# Patient Record
Sex: MALE | Race: WHITE | NOT HISPANIC OR LATINO | Employment: FULL TIME | URBAN - METROPOLITAN AREA
[De-identification: names, ages, dates, MRNs, and addresses within clinical notes are randomized per-mention and may not be internally consistent; named-entity substitution may affect disease eponyms.]

---

## 2017-10-13 ENCOUNTER — OFFICE VISIT (OUTPATIENT)
Dept: FAMILY MEDICINE CLINIC | Facility: CLINIC | Age: 27
End: 2017-10-13

## 2017-10-13 ENCOUNTER — HOSPITAL ENCOUNTER (OUTPATIENT)
Dept: GENERAL RADIOLOGY | Facility: HOSPITAL | Age: 27
Discharge: HOME | End: 2017-10-13
Admitting: NURSE PRACTITIONER

## 2017-10-13 VITALS
SYSTOLIC BLOOD PRESSURE: 116 MMHG | BODY MASS INDEX: 25.82 KG/M2 | HEART RATE: 60 BPM | WEIGHT: 201.2 LBS | DIASTOLIC BLOOD PRESSURE: 70 MMHG | HEIGHT: 74 IN | RESPIRATION RATE: 20 BRPM | TEMPERATURE: 97.5 F

## 2017-10-13 DIAGNOSIS — M25.541 PAIN INVOLVING JOINT OF FINGER OF RIGHT HAND: Primary | ICD-10-CM

## 2017-10-13 DIAGNOSIS — M25.541 PAIN INVOLVING JOINT OF FINGER OF RIGHT HAND: ICD-10-CM

## 2017-10-13 PROCEDURE — 99213 OFFICE O/P EST LOW 20 MIN: CPT | Performed by: NURSE PRACTITIONER

## 2017-10-13 PROCEDURE — 73130 X-RAY EXAM OF HAND: CPT

## 2017-10-13 NOTE — PROGRESS NOTES
Subjective   Clifford Higgins Jr. is a 27 y.o. male.     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    Review of Systems   Constitutional: Negative.    HENT: Negative.    Eyes: Negative.    Respiratory: Negative.    Cardiovascular: Negative.    Gastrointestinal: Negative.    Endocrine: Negative.    Genitourinary: Negative.    Musculoskeletal: Positive for arthralgias (ring finger on right hand swelling and decreased ROM).   Skin: Negative.    Allergic/Immunologic: Negative.    Neurological: Negative.    Hematological: Negative.    Psychiatric/Behavioral: Negative.        Objective   Physical Exam   Constitutional: He is oriented to person, place, and time. He appears well-developed and well-nourished. No distress.   HENT:   Head: Normocephalic.   Cardiovascular: Normal rate.    Pulmonary/Chest: Effort normal.   Musculoskeletal: He exhibits edema, tenderness and deformity.   Neurological: He is alert and oriented to person, place, and time.   Skin: Skin is warm and dry. No erythema.   Psychiatric: He has a normal mood and affect. His behavior is normal. Judgment and thought content normal.   Nursing note and vitals reviewed.      Assessment/Plan   Clifford was seen today for hand pain.    Diagnoses and all orders for this visit:    Pain involving joint of finger of right hand  -     XR Hand 3+ View Right; Future      Will XR right hand to rule out fracture will notify pt of results  I suspect a  tendon rupture or partial tear of tendon and recommend that pt immobilize finger with a splint and be referred to hand specialist for evaluation but he declines at this time.   Call back if wants referral placed.

## 2018-03-14 ENCOUNTER — OFFICE VISIT (OUTPATIENT)
Dept: FAMILY MEDICINE CLINIC | Facility: CLINIC | Age: 28
End: 2018-03-14

## 2018-03-14 VITALS
TEMPERATURE: 97.4 F | WEIGHT: 200.4 LBS | HEIGHT: 74 IN | HEART RATE: 76 BPM | BODY MASS INDEX: 25.72 KG/M2 | DIASTOLIC BLOOD PRESSURE: 75 MMHG | RESPIRATION RATE: 16 BRPM | SYSTOLIC BLOOD PRESSURE: 110 MMHG

## 2018-03-14 DIAGNOSIS — L30.9 DERMATITIS: Primary | ICD-10-CM

## 2018-03-14 PROCEDURE — 99213 OFFICE O/P EST LOW 20 MIN: CPT | Performed by: NURSE PRACTITIONER

## 2018-03-14 RX ORDER — TRIAMCINOLONE ACETONIDE 5 MG/G
OINTMENT TOPICAL 2 TIMES DAILY
Qty: 15 G | Refills: 0 | Status: SHIPPED | OUTPATIENT
Start: 2018-03-14 | End: 2018-11-29

## 2018-03-14 NOTE — PROGRESS NOTES
Subjective   Clifford Higgins Jr. is a 28 y.o. male.     History of Present Illness   Red rash on backs of both arms for the past several years  Has gotten better when he took steroids a long time ago  He has been using several OTC meds creams and lotions to try to make it better with no improvement  No hx of food allergies, no hx of eczema or psoriasis  Now having reaction to one of the creams he has been using  No hx of skin sensitivities   Not painful or itchy    The following portions of the patient's history were reviewed and updated as appropriate: allergies, current medications, past family history, past medical history, past social history, past surgical history and problem list.    Review of Systems   Constitutional: Negative for activity change, appetite change, chills and fever.   HENT: Negative for congestion.    Respiratory: Negative for cough and wheezing.    Musculoskeletal: Negative for arthralgias and myalgias.   Skin: Positive for rash.   Allergic/Immunologic: Negative for environmental allergies and food allergies.   Neurological: Negative for numbness.   Psychiatric/Behavioral: Negative for sleep disturbance and stress.       Objective   Physical Exam   Constitutional: He is oriented to person, place, and time. He appears well-developed and well-nourished. No distress.   HENT:   Head: Normocephalic.   Cardiovascular: Normal rate.    Pulmonary/Chest: Effort normal.   Neurological: He is alert and oriented to person, place, and time.   Skin: Skin is warm and dry. Rash noted.   Nursing note and vitals reviewed.        Assessment/Plan   Clifford was seen today for red rash on bilateral upper arms.    Diagnoses and all orders for this visit:    Dermatitis  -     triamcinolone (KENALOG) 0.5 % ointment; Apply  topically 2 (Two) Times a Day.    Use small amount of steroid cream on affected area for no more than 2 weeks then discontinue. Pt will call back If rash persists for referral pt to Dermatology.  Pt  agrees.

## 2018-11-29 ENCOUNTER — OFFICE VISIT (OUTPATIENT)
Dept: FAMILY MEDICINE CLINIC | Facility: CLINIC | Age: 28
End: 2018-11-29

## 2018-11-29 VITALS
BODY MASS INDEX: 26.37 KG/M2 | RESPIRATION RATE: 16 BRPM | HEIGHT: 74 IN | DIASTOLIC BLOOD PRESSURE: 70 MMHG | HEART RATE: 80 BPM | WEIGHT: 205.5 LBS | TEMPERATURE: 98.2 F | SYSTOLIC BLOOD PRESSURE: 110 MMHG

## 2018-11-29 DIAGNOSIS — J01.00 ACUTE NON-RECURRENT MAXILLARY SINUSITIS: Primary | ICD-10-CM

## 2018-11-29 DIAGNOSIS — R68.89 FLU-LIKE SYMPTOMS: ICD-10-CM

## 2018-11-29 DIAGNOSIS — H66.91 RIGHT ACUTE OTITIS MEDIA: ICD-10-CM

## 2018-11-29 LAB
EXPIRATION DATE: NORMAL
FLUAV AG NPH QL: NORMAL
FLUBV AG NPH QL: NORMAL
INTERNAL CONTROL: NORMAL
Lab: NORMAL

## 2018-11-29 PROCEDURE — 87804 INFLUENZA ASSAY W/OPTIC: CPT | Performed by: NURSE PRACTITIONER

## 2018-11-29 PROCEDURE — 99213 OFFICE O/P EST LOW 20 MIN: CPT | Performed by: NURSE PRACTITIONER

## 2018-11-29 RX ORDER — AZITHROMYCIN 250 MG/1
TABLET, FILM COATED ORAL
Qty: 6 TABLET | Refills: 0 | Status: SHIPPED | OUTPATIENT
Start: 2018-11-29 | End: 2019-05-22

## 2018-11-29 NOTE — PROGRESS NOTES
Subjective   Clifford Higgins Jr. is a 28 y.o. male.     History of Present Illness   Cough and sinus congestion that started on Monday  Taking OTC Mucinex and Tylenol  HA, ST, chills and runny nose and facial pain/pressure  No fever or cough or wheezing or ear pain  Exposure to illness last week at work  Thinks he has the flu, wants to be checked    The following portions of the patient's history were reviewed and updated as appropriate: allergies, current medications, past family history, past medical history, past social history, past surgical history and problem list.    Review of Systems   Constitutional: Positive for chills and fatigue. Negative for fever.   HENT: Positive for congestion, postnasal drip, rhinorrhea, sinus pressure and sore throat. Negative for ear pain, sneezing and voice change.    Respiratory: Negative for cough, chest tightness, shortness of breath and wheezing.    Cardiovascular: Negative.    Gastrointestinal: Negative.    Musculoskeletal: Negative.  Negative for arthralgias, back pain, myalgias, neck pain and neck stiffness.   Skin: Negative.    Neurological: Positive for headache. Negative for dizziness and light-headedness.   Hematological: Negative for adenopathy. Does not bruise/bleed easily.   Psychiatric/Behavioral: Negative for sleep disturbance.       Objective   Physical Exam   Constitutional: He is oriented to person, place, and time. He appears well-developed and well-nourished.   HENT:   Head: Normocephalic.   Right Ear: External ear and ear canal normal. Tympanic membrane is erythematous and retracted.   Left Ear: Tympanic membrane, external ear and ear canal normal. Tympanic membrane is not erythematous, not retracted and not bulging.   Nose: Mucosal edema and rhinorrhea present. Right sinus exhibits maxillary sinus tenderness. Left sinus exhibits maxillary sinus tenderness.   Mouth/Throat: Uvula is midline, oropharynx is clear and moist and mucous membranes are normal. No  oropharyngeal exudate, posterior oropharyngeal edema or posterior oropharyngeal erythema.   Eyes: Lids are normal.   Neck: Normal range of motion. Neck supple.   Cardiovascular: Normal rate, regular rhythm, S1 normal, S2 normal and normal heart sounds.   Pulmonary/Chest: Effort normal and breath sounds normal. No stridor. No respiratory distress. He has no wheezes.   Lymphadenopathy:        Head (right side): No tonsillar, no preauricular, no posterior auricular and no occipital adenopathy present.        Head (left side): No tonsillar, no preauricular, no posterior auricular and no occipital adenopathy present.     He has no cervical adenopathy.   Neurological: He is alert and oriented to person, place, and time.   Skin: Skin is warm, dry and intact.   Psychiatric: He has a normal mood and affect. His speech is normal and behavior is normal. Judgment and thought content normal. Cognition and memory are normal.   Nursing note and vitals reviewed.        Assessment/Plan   Clifford was seen today for cough, sinus congestion, malaise.    Diagnoses and all orders for this visit:    Acute non-recurrent maxillary sinusitis  -     azithromycin (ZITHROMAX) 250 MG tablet; Take 2 tablets the first day, then 1 tablet daily for 4 days.    Right acute otitis media  -     azithromycin (ZITHROMAX) 250 MG tablet; Take 2 tablets the first day, then 1 tablet daily for 4 days.    Flu-like symptoms  -     POCT Influenza A/B    Flu A&B negative pt informed  Recommend pt take multi sx cold medication for sx and take antibiotics until gone  Warm saline gargles and rest, fluids. Note off work given.

## 2019-05-22 ENCOUNTER — OFFICE VISIT (OUTPATIENT)
Dept: FAMILY MEDICINE CLINIC | Facility: CLINIC | Age: 29
End: 2019-05-22

## 2019-05-22 VITALS
OXYGEN SATURATION: 98 % | SYSTOLIC BLOOD PRESSURE: 108 MMHG | DIASTOLIC BLOOD PRESSURE: 70 MMHG | HEART RATE: 69 BPM | WEIGHT: 202 LBS | BODY MASS INDEX: 25.92 KG/M2 | TEMPERATURE: 98 F | RESPIRATION RATE: 16 BRPM

## 2019-05-22 DIAGNOSIS — K40.90 UNILATERAL INGUINAL HERNIA WITHOUT OBSTRUCTION OR GANGRENE, RECURRENCE NOT SPECIFIED: Primary | ICD-10-CM

## 2019-05-22 PROCEDURE — 99213 OFFICE O/P EST LOW 20 MIN: CPT | Performed by: NURSE PRACTITIONER

## 2019-05-22 NOTE — PROGRESS NOTES
Subjective   Clifford Higgins Jr. is a 29 y.o. male.     History of Present Illness   Bulging area on right lower abdomen for past several years (3-4) when he first started lifting heavy weights.  Worse when he coughs or sneezes really notices it. No difficulty having BM or urinating  He denies swelling in testicular area or pain.  No real pain just feel weird, he has continued to lift weights and remains active    The following portions of the patient's history were reviewed and updated as appropriate: allergies, current medications, past family history, past medical history, past social history, past surgical history and problem list.    Review of Systems   Constitutional: Negative for activity change.   Gastrointestinal: Negative for abdominal pain. Abdominal distention: bulging area.   Genitourinary: Negative for difficulty urinating, hematuria, scrotal swelling and testicular pain.   Skin: Negative for color change.       Objective   Physical Exam   Constitutional: He is oriented to person, place, and time. He appears well-developed and well-nourished. No distress.   HENT:   Head: Normocephalic.   Nose: Nose normal.   Neck: Neck supple.   Cardiovascular: Normal rate, regular rhythm and normal heart sounds.   Pulmonary/Chest: Effort normal and breath sounds normal.   Abdominal: Soft. Bowel sounds are normal. There is no tenderness. A hernia (right inguinal ) is present.   Neurological: He is alert and oriented to person, place, and time.   Skin: Skin is warm and dry. He is not diaphoretic.   Psychiatric: He has a normal mood and affect. His behavior is normal. Judgment and thought content normal.         Assessment/Plan   Clifford was seen today for cyst.    Diagnoses and all orders for this visit:    Unilateral inguinal hernia without obstruction or gangrene, recurrence not specified  -     US pelvis limited      Will refer for US of right inguinal area  Will refer for surgical consult also after US

## 2019-05-30 DIAGNOSIS — K40.90 REDUCIBLE RIGHT INGUINAL HERNIA: ICD-10-CM

## 2019-05-30 DIAGNOSIS — K40.90 UNILATERAL INGUINAL HERNIA WITHOUT OBSTRUCTION OR GANGRENE, RECURRENCE NOT SPECIFIED: Primary | ICD-10-CM

## 2020-03-30 ENCOUNTER — OFFICE VISIT (OUTPATIENT)
Dept: FAMILY MEDICINE CLINIC | Facility: CLINIC | Age: 30
End: 2020-03-30

## 2020-03-30 VITALS
HEART RATE: 76 BPM | RESPIRATION RATE: 18 BRPM | TEMPERATURE: 97.4 F | WEIGHT: 196.8 LBS | SYSTOLIC BLOOD PRESSURE: 128 MMHG | BODY MASS INDEX: 26.08 KG/M2 | DIASTOLIC BLOOD PRESSURE: 84 MMHG | HEIGHT: 73 IN

## 2020-03-30 DIAGNOSIS — E87.6 HYPOKALEMIA: ICD-10-CM

## 2020-03-30 DIAGNOSIS — R00.2 INTERMITTENT PALPITATIONS: Primary | ICD-10-CM

## 2020-03-30 PROCEDURE — 93000 ELECTROCARDIOGRAM COMPLETE: CPT | Performed by: FAMILY MEDICINE

## 2020-03-30 PROCEDURE — 99214 OFFICE O/P EST MOD 30 MIN: CPT | Performed by: FAMILY MEDICINE

## 2020-03-30 NOTE — PROGRESS NOTES
Assessment/Plan       Problems Addressed this Visit     None      Visit Diagnoses     Intermittent palpitations    -  Primary    Relevant Orders    Comprehensive Metabolic Panel    Magnesium    TSH Rfx On Abnormal To Free T4    Hypokalemia        Relevant Orders    Comprehensive Metabolic Panel    Magnesium            Follow up: Return for follow up depends on review of labs and testing.     DISCUSSION  Intermittent palpitations.  Started after he stopped taking his pre-workout supplement that may have had increased caffeine and other herbal medications.  EKG showed possible ST elevation consistent with pericarditis but he does not have symptoms of such.  He has had no sharp chest pain or shortness of breath.    We will check labs including TSH, magnesium and potassium.  It was noted that he had low potassium at the ER.    We will be looking for ER records as well.    Further plan once labs and ER records reviewed.          MEDICATIONS PRESCRIBED  Requested Prescriptions      No prescriptions requested or ordered in this encounter          -------------------------------------------    Subjective     Chief Complaint   Patient presents with   • Palpitations     ER F/U has been off of supplements he was taking. Still having some palpitations. Wanted to see how long it will last.          Palpitations    This is a new problem. The current episode started in the past 7 days (had gone to ER on Friday, had constant palpatations. To [facility] ER. ). The problem occurs intermittently. The problem has been waxing and waning. Associated symptoms include chest pain (tight, not sharp) and malaise/fatigue. Pertinent negatives include no anxiety, dizziness, nausea, near-syncope, shortness of breath (feels like hard to catch breath) or vomiting. Associated symptoms comments: Chest tightness  . He has tried nothing for the symptoms. The treatment provided no relief. There are no known risk factors (had been on a pre work out  "supplement). There is no history of anxiety, heart disease or hyperthyroidism.       Had been a supplement and stopped it the day the gyms closed. Took 5 days after stopping the supplement for sx to develop    A pre work , had a lot of caffeine in it.     OptiNOs and had been taking for a month and stopped it suddenly    One headache 2 nights ago, migraine    Not feeling now  Worse at hs    No syncope  + tired with it  No chest pain   Chest tight when occurs    Constant for hours at hs    Had labs at hospital  Had low K and gave potassium , friday night    Sleep till noon    ER gave an anxiety med   Denies anxiety            Social History     Tobacco Use   Smoking Status Never Smoker   Smokeless Tobacco Never Used          Past Medical History,Medications, Allergies, and social history was reviewed.          Review of Systems   Constitutional: Positive for malaise/fatigue.   HENT: Negative.    Respiratory: Negative.  Negative for shortness of breath (feels like hard to catch breath).    Cardiovascular: Positive for chest pain (tight, not sharp) and palpitations. Negative for near-syncope.   Gastrointestinal: Negative.  Negative for nausea and vomiting.   Neurological: Negative.  Negative for dizziness and syncope.   Psychiatric/Behavioral: Negative.  The patient is not nervous/anxious.        Objective     Vitals:    03/30/20 1613   BP: 128/84   Pulse: 76   Resp: 18   Temp: 97.4 °F (36.3 °C)   Weight: 89.3 kg (196 lb 12.8 oz)   Height: 185.4 cm (73\")           Physical Exam   Constitutional: Vital signs are normal. He appears well-developed and well-nourished.   HENT:   Head: Normocephalic and atraumatic.   Right Ear: Hearing, tympanic membrane, external ear and ear canal normal.   Left Ear: Hearing, tympanic membrane, external ear and ear canal normal.   Mouth/Throat: Oropharynx is clear and moist.   Eyes: Pupils are equal, round, and reactive to light. Conjunctivae, EOM and lids are normal.   Neck: Normal range " of motion. Neck supple. No thyromegaly present.   Cardiovascular: Normal rate, regular rhythm and normal heart sounds. Exam reveals no friction rub.   No murmur heard.  Pulmonary/Chest: Effort normal and breath sounds normal. No respiratory distress. He has no wheezes. He has no rales.   Abdominal: Soft. Normal appearance and bowel sounds are normal. He exhibits no distension and no mass. There is no tenderness. There is no rebound and no guarding.   Musculoskeletal: He exhibits no edema.   Neurological: He is alert. He has normal strength.   Skin: Skin is warm and dry.   Psychiatric: He has a normal mood and affect. His speech is normal. Cognition and memory are normal.   Nursing note and vitals reviewed.      ECG 12 Lead  Date/Time: 3/30/2020 4:52 PM  Performed by: Clifford Millan MD  Authorized by: Clifford Millan MD   Comparison: not compared with previous ECG   Previous ECG: no previous ECG available  Rhythm: sinus rhythm  Rate: normal  BPM: 72  Conduction: conduction normal  ST Segments: ST segments normal  T Waves: T waves normal  QRS axis: normal  Other findings: non-specific ST-T wave changes  Other findings comments: mild increased ST elevation, diffuse    Clinical impression: non-specific ECG  Comments: No palpitations.  The mild ST elevation is minimal and he does not have symptoms consistent with pericarditis.                    Clifford Millan MD

## 2020-03-31 ENCOUNTER — TELEPHONE (OUTPATIENT)
Dept: FAMILY MEDICINE CLINIC | Facility: CLINIC | Age: 30
End: 2020-03-31

## 2020-03-31 LAB
ALBUMIN SERPL-MCNC: 4.8 G/DL (ref 3.5–5.2)
ALBUMIN/GLOB SERPL: 2 G/DL
ALP SERPL-CCNC: 57 U/L (ref 39–117)
ALT SERPL-CCNC: 30 U/L (ref 1–41)
AST SERPL-CCNC: 20 U/L (ref 1–40)
BILIRUB SERPL-MCNC: 0.7 MG/DL (ref 0.2–1.2)
BUN SERPL-MCNC: 15 MG/DL (ref 6–20)
BUN/CREAT SERPL: 11.9 (ref 7–25)
CALCIUM SERPL-MCNC: 10.1 MG/DL (ref 8.6–10.5)
CHLORIDE SERPL-SCNC: 104 MMOL/L (ref 98–107)
CO2 SERPL-SCNC: 26.7 MMOL/L (ref 22–29)
CREAT SERPL-MCNC: 1.26 MG/DL (ref 0.76–1.27)
GLOBULIN SER CALC-MCNC: 2.4 GM/DL
GLUCOSE SERPL-MCNC: 87 MG/DL (ref 65–99)
MAGNESIUM SERPL-MCNC: 2 MG/DL (ref 1.6–2.6)
POTASSIUM SERPL-SCNC: 4.3 MMOL/L (ref 3.5–5.2)
PROT SERPL-MCNC: 7.2 G/DL (ref 6–8.5)
SODIUM SERPL-SCNC: 141 MMOL/L (ref 136–145)
TSH SERPL DL<=0.005 MIU/L-ACNC: 3.06 UIU/ML (ref 0.27–4.2)

## 2020-03-31 NOTE — TELEPHONE ENCOUNTER
Please call [facility] for recent ER visit, EKG tracing, and blood work and any x-rays or radiology test done.

## 2020-04-03 DIAGNOSIS — R00.2 INTERMITTENT PALPITATIONS: Primary | ICD-10-CM

## 2020-04-29 ENCOUNTER — HOSPITAL ENCOUNTER (OUTPATIENT)
Dept: CARDIOLOGY | Facility: HOSPITAL | Age: 30
Discharge: HOME | End: 2020-04-29
Admitting: FAMILY MEDICINE

## 2020-04-29 VITALS — WEIGHT: 196 LBS | HEIGHT: 73 IN | BODY MASS INDEX: 25.98 KG/M2

## 2020-04-29 DIAGNOSIS — R00.2 INTERMITTENT PALPITATIONS: ICD-10-CM

## 2020-04-29 LAB
BH CV ECHO MEAS - AO ROOT AREA (BSA CORRECTED): 1.5
BH CV ECHO MEAS - AO ROOT AREA: 8.5 CM^2
BH CV ECHO MEAS - AO ROOT DIAM: 3.3 CM
BH CV ECHO MEAS - ASC AORTA: 2.5 CM
BH CV ECHO MEAS - BSA(HAYCOCK): 2.1 M^2
BH CV ECHO MEAS - BSA: 2.1 M^2
BH CV ECHO MEAS - BZI_BMI: 25.9 KILOGRAMS/M^2
BH CV ECHO MEAS - BZI_METRIC_HEIGHT: 185.4 CM
BH CV ECHO MEAS - BZI_METRIC_WEIGHT: 88.9 KG
BH CV ECHO MEAS - EDV(CUBED): 154.3 ML
BH CV ECHO MEAS - EDV(MOD-SP2): 109 ML
BH CV ECHO MEAS - EDV(MOD-SP4): 103 ML
BH CV ECHO MEAS - EDV(TEICH): 139.1 ML
BH CV ECHO MEAS - EF(CUBED): 67.2 %
BH CV ECHO MEAS - EF(MOD-BP): 67 %
BH CV ECHO MEAS - EF(MOD-SP2): 68.8 %
BH CV ECHO MEAS - EF(MOD-SP4): 66 %
BH CV ECHO MEAS - EF(TEICH): 58.3 %
BH CV ECHO MEAS - ESV(CUBED): 50.6 ML
BH CV ECHO MEAS - ESV(MOD-SP2): 34 ML
BH CV ECHO MEAS - ESV(MOD-SP4): 35 ML
BH CV ECHO MEAS - ESV(TEICH): 58 ML
BH CV ECHO MEAS - FS: 31.1 %
BH CV ECHO MEAS - IVS/LVPW: 1.1
BH CV ECHO MEAS - IVSD: 0.88 CM
BH CV ECHO MEAS - LA DIMENSION: 3.8 CM
BH CV ECHO MEAS - LA/AO: 1.2
BH CV ECHO MEAS - LAD MAJOR: 5.8 CM
BH CV ECHO MEAS - LAT PEAK E' VEL: 14.1 CM/SEC
BH CV ECHO MEAS - LATERAL E/E' RATIO: 7.3
BH CV ECHO MEAS - LV DIASTOLIC VOL/BSA (35-75): 48.3 ML/M^2
BH CV ECHO MEAS - LV MASS(C)D: 166 GRAMS
BH CV ECHO MEAS - LV MASS(C)DI: 77.8 GRAMS/M^2
BH CV ECHO MEAS - LV SYSTOLIC VOL/BSA (12-30): 16.4 ML/M^2
BH CV ECHO MEAS - LVIDD: 5.4 CM
BH CV ECHO MEAS - LVIDS: 3.7 CM
BH CV ECHO MEAS - LVLD AP2: 8.6 CM
BH CV ECHO MEAS - LVLD AP4: 8.6 CM
BH CV ECHO MEAS - LVLS AP2: 7.5 CM
BH CV ECHO MEAS - LVLS AP4: 7.4 CM
BH CV ECHO MEAS - LVPWD: 0.82 CM
BH CV ECHO MEAS - MED PEAK E' VEL: 12.5 CM/SEC
BH CV ECHO MEAS - MEDIAL E/E' RATIO: 8.3
BH CV ECHO MEAS - MV A MAX VEL: 66 CM/SEC
BH CV ECHO MEAS - MV DEC TIME: 0.17 SEC
BH CV ECHO MEAS - MV E MAX VEL: 104.3 CM/SEC
BH CV ECHO MEAS - MV E/A: 1.6
BH CV ECHO MEAS - PA ACC SLOPE: 631.5 CM/SEC^2
BH CV ECHO MEAS - PA ACC TIME: 0.17 SEC
BH CV ECHO MEAS - PA PR(ACCEL): 2.9 MMHG
BH CV ECHO MEAS - PI END-D VEL: 96.4 CM/SEC
BH CV ECHO MEAS - RAP SYSTOLE: 3 MMHG
BH CV ECHO MEAS - RVDD: 2.6 CM
BH CV ECHO MEAS - RVSP: 24 MMHG
BH CV ECHO MEAS - SI(CUBED): 48.6 ML/M^2
BH CV ECHO MEAS - SI(MOD-SP2): 35.2 ML/M^2
BH CV ECHO MEAS - SI(MOD-SP4): 31.9 ML/M^2
BH CV ECHO MEAS - SI(TEICH): 38 ML/M^2
BH CV ECHO MEAS - SV(CUBED): 103.7 ML
BH CV ECHO MEAS - SV(MOD-SP2): 75 ML
BH CV ECHO MEAS - SV(MOD-SP4): 68 ML
BH CV ECHO MEAS - SV(TEICH): 81.1 ML
BH CV ECHO MEAS - TAPSE (>1.6): 2.5 CM2
BH CV ECHO MEAS - TR MAX PG: 21 MMHG
BH CV ECHO MEAS - TR MAX VEL: 230.1 CM/SEC
BH CV ECHO MEASUREMENTS AVERAGE E/E' RATIO: 7.84
BH CV VAS BP LEFT ARM: NORMAL MMHG
BH CV XLRA - RV BASE: 4 CM
BH CV XLRA - RV LENGTH: 3 CM
BH CV XLRA - RV MID: 8.4 CM
BH CV XLRA - TDI S': 13.5 CM/SEC
LEFT ATRIUM VOLUME INDEX: 28.6 ML/M^2
LEFT ATRIUM VOLUME: 61 ML

## 2020-04-29 PROCEDURE — 93306 TTE W/DOPPLER COMPLETE: CPT | Performed by: INTERNAL MEDICINE

## 2020-04-29 PROCEDURE — 93306 TTE W/DOPPLER COMPLETE: CPT

## 2020-05-05 DIAGNOSIS — R55 NEAR SYNCOPE: ICD-10-CM

## 2020-05-05 DIAGNOSIS — R00.2 INTERMITTENT PALPITATIONS: Primary | ICD-10-CM

## 2020-05-13 ENCOUNTER — TELEPHONE (OUTPATIENT)
Dept: FAMILY MEDICINE CLINIC | Facility: CLINIC | Age: 30
End: 2020-05-13

## 2020-05-13 NOTE — TELEPHONE ENCOUNTER
Appt w/ [facility]. Pt didn't want to wait wk and a half so he scheduled this appt since they could get him in this wk. Records faxed via Epic    (P [phone])

## 2020-05-13 NOTE — TELEPHONE ENCOUNTER
PT CALLED AND IS REQUESTING THAT ANY RESULTS OR TESTING IN THE PAST TWO MONTHS TO THIS FAX NUMBER:  [phone]

## 2020-07-13 ENCOUNTER — OFFICE VISIT (OUTPATIENT)
Dept: FAMILY MEDICINE CLINIC | Facility: CLINIC | Age: 30
End: 2020-07-13

## 2020-07-13 ENCOUNTER — HOSPITAL ENCOUNTER (OUTPATIENT)
Dept: GENERAL RADIOLOGY | Facility: HOSPITAL | Age: 30
Discharge: HOME | End: 2020-07-13
Admitting: PHYSICIAN ASSISTANT

## 2020-07-13 VITALS
DIASTOLIC BLOOD PRESSURE: 76 MMHG | WEIGHT: 198.4 LBS | SYSTOLIC BLOOD PRESSURE: 126 MMHG | RESPIRATION RATE: 18 BRPM | HEIGHT: 73 IN | BODY MASS INDEX: 26.29 KG/M2 | TEMPERATURE: 98.6 F | OXYGEN SATURATION: 98 % | HEART RATE: 67 BPM

## 2020-07-13 DIAGNOSIS — M25.562 ACUTE PAIN OF LEFT KNEE: Primary | ICD-10-CM

## 2020-07-13 DIAGNOSIS — S89.92XA INJURY OF LEFT KNEE, INITIAL ENCOUNTER: ICD-10-CM

## 2020-07-13 DIAGNOSIS — M25.362 KNEE INSTABILITY, LEFT: ICD-10-CM

## 2020-07-13 PROCEDURE — 73560 X-RAY EXAM OF KNEE 1 OR 2: CPT

## 2020-07-13 PROCEDURE — 99213 OFFICE O/P EST LOW 20 MIN: CPT | Performed by: PHYSICIAN ASSISTANT

## 2020-07-13 RX ORDER — IBUPROFEN 600 MG/1
600 TABLET ORAL EVERY 6 HOURS PRN
Qty: 30 TABLET | Refills: 0 | Status: SHIPPED | OUTPATIENT
Start: 2020-07-13 | End: 2021-05-12

## 2020-07-13 NOTE — PROGRESS NOTES
Subjective   Clifford Higgins Jr. is a 30 y.o. male.     History of Present Illness   Pt presents with CC of L knee injury X 1 month ago   Felt sudden burning while working out   Felt during squatting. Unsure how much weight he was using.   Pain is mostly medial   Some swelling after pain first started   Feels like knee does not want to fully straighten out  Knee popping a lot   Feels like knee is going to buckle at times  Has still been working full time as  and working out. Has not rested  Has not tried icing or NSAID   No hx of previous knee injury or surgery   Notes he played sports growing up so did have knee pain before     The following portions of the patient's history were reviewed and updated as appropriate: allergies, current medications, past family history, past medical history, past social history, past surgical history and problem list.    Review of Systems   Constitutional: Negative for appetite change, chills, diaphoresis, fatigue and fever.   HENT: Negative.  Negative for congestion, ear discharge, ear pain, hearing loss, nosebleeds, postnasal drip, sinus pressure, sneezing and sore throat.    Eyes: Negative.    Respiratory: Negative.  Negative for cough, chest tightness, shortness of breath and wheezing.    Cardiovascular: Negative.  Negative for chest pain, palpitations and leg swelling.   Gastrointestinal: Negative for abdominal distention, abdominal pain, blood in stool, constipation, diarrhea, nausea and vomiting.   Genitourinary: Negative.  Negative for difficulty urinating, dysuria, flank pain, frequency, hematuria and urgency.   Musculoskeletal: Positive for arthralgias and joint swelling. Negative for back pain, gait problem, myalgias, neck pain and neck stiffness.   Skin: Negative.  Negative for color change, pallor, rash and wound.   Neurological: Negative for dizziness, syncope, weakness, light-headedness, numbness and headaches.       Objective    Blood pressure 126/76, pulse  "67, temperature 98.6 °F (37 °C), temperature source Temporal, resp. rate 18, height 185.4 cm (72.99\"), weight 90 kg (198 lb 6.4 oz), SpO2 98 %.     Physical Exam   Constitutional: He is oriented to person, place, and time. He appears well-developed and well-nourished.   HENT:   Head: Normocephalic and atraumatic.   Right Ear: External ear normal.   Left Ear: External ear normal.   Nose: Nose normal.   Eyes: Conjunctivae are normal.   Neck: Normal range of motion. Neck supple. No tracheal deviation present. No thyromegaly present.   Cardiovascular: Normal rate, regular rhythm, normal heart sounds and intact distal pulses.   Pulmonary/Chest: Effort normal and breath sounds normal. No respiratory distress. He has no wheezes. He has no rales. He exhibits no tenderness.   Musculoskeletal: He exhibits no edema or deformity.        Left knee: He exhibits decreased range of motion and bony tenderness. He exhibits no swelling, no effusion and no ecchymosis. Tenderness found. Medial joint line tenderness noted.   Popping of knee with passive ROM    Lymphadenopathy:     He has no cervical adenopathy.   Neurological: He is alert and oriented to person, place, and time.   Skin: Skin is warm and dry.   Psychiatric: He has a normal mood and affect. His behavior is normal. Judgment and thought content normal.   Nursing note and vitals reviewed.      Assessment/Plan   Clifford was seen today for knee pain.    Diagnoses and all orders for this visit:    Acute pain of left knee  -     ibuprofen (ADVIL,MOTRIN) 600 MG tablet; Take 1 tablet by mouth Every 6 (Six) Hours As Needed for Mild Pain .  -     XR Knee 1 or 2 View Left  -     Ambulatory Referral to Physical Therapy Evaluate and treat  -     MRI Knee Left Without Contrast    Injury of left knee, initial encounter  -     MRI Knee Left Without Contrast    Knee instability, left  -     MRI Knee Left Without Contrast    proceed with XR of Left knee and referral to PT  Rest and NSAID as " directed. Avoid heavy weight bearing activity and squatting to avoid further exacerbating pain.   Will see if MRI can be approved to look for potential ligament or meniscus injury

## 2020-08-31 ENCOUNTER — TELEPHONE (OUTPATIENT)
Dept: FAMILY MEDICINE CLINIC | Facility: CLINIC | Age: 30
End: 2020-08-31

## 2020-08-31 NOTE — TELEPHONE ENCOUNTER
PATIENT REQUESTING A  REFERRAL TO HAVE AN MRI ON LEFT KNEE    PATIENT HAS HAD PHYSICAL THERAPY APPOINTMENTS BUT HAS NOT BEEN EFFECTIVE      PATIENT CALL BACK  [phone]

## 2020-09-01 NOTE — TELEPHONE ENCOUNTER
Order for MRI for L knee was placed on 7/13/20. Please let Deyanira know he is ready to schedule. SANIYA

## 2020-09-17 ENCOUNTER — APPOINTMENT (OUTPATIENT)
Dept: MRI IMAGING | Facility: HOSPITAL | Age: 30
End: 2020-09-17

## 2020-10-02 ENCOUNTER — APPOINTMENT (OUTPATIENT)
Dept: MRI IMAGING | Facility: HOSPITAL | Age: 30
End: 2020-10-02

## 2020-10-05 ENCOUNTER — HOSPITAL ENCOUNTER (OUTPATIENT)
Dept: MRI IMAGING | Facility: HOSPITAL | Age: 30
Discharge: HOME | End: 2020-10-05
Admitting: PHYSICIAN ASSISTANT

## 2020-10-05 PROCEDURE — 73721 MRI JNT OF LWR EXTRE W/O DYE: CPT

## 2020-10-08 ENCOUNTER — TELEPHONE (OUTPATIENT)
Dept: PEDIATRICS | Facility: OTHER | Age: 30
End: 2020-10-08

## 2020-10-08 NOTE — TELEPHONE ENCOUNTER
PATIENT CALLED INQUIRING ABOUT THE RESULTS OF HIS MRI THAT WAS DONE 10/5/20    PATIENT IS REQUESTING A CALL BACK WITH RESULTS    CALL BACK NUMBER [phone]

## 2020-10-12 ENCOUNTER — TELEPHONE (OUTPATIENT)
Dept: PEDIATRICS | Facility: OTHER | Age: 30
End: 2020-10-12

## 2020-10-12 DIAGNOSIS — M25.562 CHRONIC PAIN OF LEFT KNEE: Primary | ICD-10-CM

## 2020-10-12 DIAGNOSIS — G89.29 CHRONIC PAIN OF LEFT KNEE: Primary | ICD-10-CM

## 2020-10-12 NOTE — TELEPHONE ENCOUNTER
PT STATES THAT HE HAS SOME QUESTION REGARDING HIS MRI.      PT IS REQUESTING  A CALL BACK TO DISCUSS.      PLEASE ADVISE  [phone]

## 2020-10-12 NOTE — TELEPHONE ENCOUNTER
Pt is still having issues and does want a referral to Ortho. Please place referral and I informed him he will need to take a copy of the disc to that appt with him.

## 2021-04-15 ENCOUNTER — TELEPHONE (OUTPATIENT)
Dept: FAMILY MEDICINE CLINIC | Facility: CLINIC | Age: 31
End: 2021-04-15

## 2021-04-15 DIAGNOSIS — M25.562 CHRONIC PAIN OF LEFT KNEE: Primary | ICD-10-CM

## 2021-04-15 DIAGNOSIS — G89.29 CHRONIC PAIN OF LEFT KNEE: Primary | ICD-10-CM

## 2021-04-15 NOTE — TELEPHONE ENCOUNTER
DELETE AFTER REVIEWING: Telephone encounter to be sent to the clinical pool     Caller: Clifford Higgins Jr.    Relationship: Self    Best call back number: [phone]    What is the medical concern/diagnosis: KNEE PAIN    What specialty or service is being requested: KNEE SPECIALIST     Any additional details: STILL IN A LOT OF PAIN.  PLEASE REFER ME TO A KNEE SPECIALIST

## 2021-04-19 NOTE — TELEPHONE ENCOUNTER
New order placed. Please let patient know about last time when we tried to contact him. Make sure voicemail is set up and not full. SANIYA

## 2021-04-19 NOTE — TELEPHONE ENCOUNTER
Patient was referred to specialist for his knee back in October. Did he go? Check with referral coordinator to see if order is still valid, if not will place new order. SANIYA

## 2021-04-30 ENCOUNTER — TELEPHONE (OUTPATIENT)
Dept: FAMILY MEDICINE CLINIC | Facility: CLINIC | Age: 31
End: 2021-04-30

## 2021-04-30 NOTE — TELEPHONE ENCOUNTER
Hub staff attempted to follow warm transfer process and was unsuccessful     Caller: Clifford Higgins Jr.    Relationship to patient: Self    Best call back number: [phone]     Patient is needing: PATIENT RETURNED CALL FROM THE OFFICE ABOUT SCHEDULING AN APPOINTMENT WITH A KNEE SPECIALIST.

## 2021-05-12 ENCOUNTER — OFFICE VISIT (OUTPATIENT)
Dept: ORTHOPEDIC SURGERY | Facility: CLINIC | Age: 31
End: 2021-05-12

## 2021-05-12 VITALS
HEART RATE: 61 BPM | DIASTOLIC BLOOD PRESSURE: 85 MMHG | HEIGHT: 73 IN | WEIGHT: 195 LBS | BODY MASS INDEX: 25.84 KG/M2 | SYSTOLIC BLOOD PRESSURE: 127 MMHG

## 2021-05-12 DIAGNOSIS — M25.562 CHRONIC PAIN OF LEFT KNEE: Primary | ICD-10-CM

## 2021-05-12 DIAGNOSIS — G89.29 CHRONIC PAIN OF LEFT KNEE: Primary | ICD-10-CM

## 2021-05-12 PROCEDURE — 99203 OFFICE O/P NEW LOW 30 MIN: CPT | Performed by: ORTHOPAEDIC SURGERY

## 2021-05-12 NOTE — PROGRESS NOTES
"      [facility] Note    Subjective     CC: Pain of the Left Knee      HPI    Clifford Higgins Jr. is a 31 y.o. male who presents with new problem of: left knee pain.  Onset: weight lifting. The issue has been ongoing for 10 month(s). Pain is a 2/10 on the pain scale. Pain is described as grinding. Associated symptoms include pain, swelling, stiffness and clicking. The pain is worse with walking, standing, climbing stairs and running; resting improve the pain. Previous treatments have included: physical therapy.    I have reviewed the following portions of the patient's history:History of Present Illness and review of systems.    He injured his left knee working out about a year ago.  He felt a burning in his left quad.  He does physical therapy.  Review of Systems   Constitutional: Negative.  Negative for chills, fatigue and fever.   HENT: Positive for congestion and ear pain. Negative for dental problem.    Eyes: Negative.  Negative for blurred vision.   Respiratory: Negative.  Negative for shortness of breath.    Cardiovascular: Positive for palpitations. Negative for leg swelling.   Gastrointestinal: Negative.  Negative for abdominal pain.   Endocrine: Negative.  Negative for polyuria.   Genitourinary: Negative.  Negative for difficulty urinating.   Musculoskeletal: Positive for arthralgias.   Skin: Negative.    Allergic/Immunologic: Positive for environmental allergies.   Neurological: Positive for headache.   Hematological: Negative.  Negative for adenopathy.   Psychiatric/Behavioral: Negative.  Negative for behavioral problems.       ROS:    Constiutional:Pt denies fever, chills, nausea, or vomiting.  MSK:as above      Objective      Past Medical History  Past Medical History:   Diagnosis Date   • Arthralgia     comression of the knee/patella/tibia/fibula   • Cough, persistent          Physical Exam  /85   Pulse 61   Ht 185.4 cm (72.99\")   Wt 88.5 kg (195 lb)   BMI 25.73 kg/m² "     Body mass index is 25.73 kg/m².    Patient is well nourished and well developed.        Ortho Exam  Left knee swollen quadriceps compared to the right otherwise full motion no effusion.  Stable ligaments.  He had a palpable plica in the left knee but it was nontender    Imaging/Labs/EMG Reviewed:  Imaging Results (Last 24 Hours)     ** No results found for the last 24 hours. **        I viewed his x-rays from July 2020 which are negative.  Reviewed his MRI from October 8, 2020 which was negative  Assessment:  1. Chronic pain of left knee        Plan:  1. Recommend over the counter anti-inflammatories for pain and/or swelling  2. I offered him anti-inflammatories and physical therapy.  He would like to continue to do things on his own.  He will follow-up as needed.    Follow Up:   Return if symptoms worsen or fail to improve.      Medical Decision Making  Management Options : Low - OTC Drugs        William Adair M.D., FAAOS  Orthopedic Surgeon  Fellowship Trained Sports Medicine  [facility]  Orthopedics and Sports Medicine  [address] [place] [address], [address]  [place] [address]

## 2023-02-28 ENCOUNTER — OFFICE VISIT (OUTPATIENT)
Dept: FAMILY MEDICINE CLINIC | Facility: CLINIC | Age: 33
End: 2023-02-28
Payer: COMMERCIAL

## 2023-02-28 VITALS
RESPIRATION RATE: 18 BRPM | HEIGHT: 74 IN | OXYGEN SATURATION: 99 % | TEMPERATURE: 98 F | WEIGHT: 205.8 LBS | DIASTOLIC BLOOD PRESSURE: 64 MMHG | SYSTOLIC BLOOD PRESSURE: 122 MMHG | HEART RATE: 60 BPM | BODY MASS INDEX: 26.41 KG/M2

## 2023-02-28 DIAGNOSIS — M77.8 TRICEPS TENDONITIS: Primary | ICD-10-CM

## 2023-02-28 PROCEDURE — 99213 OFFICE O/P EST LOW 20 MIN: CPT | Performed by: PHYSICIAN ASSISTANT

## 2023-02-28 RX ORDER — PREDNISONE 10 MG/1
TABLET ORAL
Qty: 21 EACH | Refills: 0 | Status: SHIPPED | OUTPATIENT
Start: 2023-02-28

## 2023-02-28 NOTE — PROGRESS NOTES
"Subjective   Clifford Higgins Jr. is a 32 y.o. male.     History of Present Illness   Left arm discomfort X 1 week. Posterior above elbow joint at the insertion of triceps muscle    Works out regularly.  Triceps exercise cause worsening discomfort   No swelling or bruising   No numbness or tingling   Popping at times   Has been icing after work out recently    The following portions of the patient's history were reviewed and updated as appropriate: allergies, current medications, past family history, past medical history, past social history, past surgical history and problem list.    Review of Systems  As noted per HPI     Objective    Blood pressure 122/64, pulse 60, temperature 98 °F (36.7 °C), resp. rate 18, height 188 cm (74\"), weight 93.4 kg (205 lb 12.8 oz), SpO2 99 %.     Physical Exam  Vitals and nursing note reviewed.   Constitutional:       Appearance: Normal appearance.   HENT:      Head: Normocephalic.   Eyes:      Conjunctiva/sclera: Conjunctivae normal.   Cardiovascular:      Rate and Rhythm: Normal rate.   Pulmonary:      Effort: Pulmonary effort is normal.   Musculoskeletal:        Arms:    Skin:     General: Skin is warm and dry.   Neurological:      Mental Status: He is alert.   Psychiatric:         Mood and Affect: Mood normal.         Assessment & Plan   Diagnoses and all orders for this visit:    1. Triceps tendonitis (Primary)  -     predniSONE (DELTASONE) 10 MG (21) dose pack; Use as directed on package  Dispense: 21 each; Refill: 0      Suspect triceps tendonitis. Rest, ice, steroid taper as noted. Compression brace.   Call if not improving for formal PT            "

## 2023-11-27 ENCOUNTER — OFFICE VISIT (OUTPATIENT)
Dept: FAMILY MEDICINE CLINIC | Facility: CLINIC | Age: 33
End: 2023-11-27
Payer: COMMERCIAL

## 2023-11-27 VITALS
HEIGHT: 74 IN | HEART RATE: 72 BPM | BODY MASS INDEX: 26.77 KG/M2 | WEIGHT: 208.6 LBS | SYSTOLIC BLOOD PRESSURE: 114 MMHG | RESPIRATION RATE: 18 BRPM | DIASTOLIC BLOOD PRESSURE: 68 MMHG | OXYGEN SATURATION: 98 % | TEMPERATURE: 97.1 F

## 2023-11-27 DIAGNOSIS — R21 RASH AND NONSPECIFIC SKIN ERUPTION: Primary | ICD-10-CM

## 2023-11-27 DIAGNOSIS — R05.3 PERSISTENT COUGH FOR 3 WEEKS OR LONGER: ICD-10-CM

## 2023-11-27 DIAGNOSIS — J01.00 ACUTE MAXILLARY SINUSITIS, RECURRENCE NOT SPECIFIED: ICD-10-CM

## 2023-11-27 DIAGNOSIS — M27.40 CYST, JAW: ICD-10-CM

## 2023-11-27 PROCEDURE — 99214 OFFICE O/P EST MOD 30 MIN: CPT | Performed by: PHYSICIAN ASSISTANT

## 2023-11-27 RX ORDER — BENZONATATE 100 MG/1
100 CAPSULE ORAL 3 TIMES DAILY PRN
Qty: 30 CAPSULE | Refills: 0 | Status: SHIPPED | OUTPATIENT
Start: 2023-11-27

## 2023-11-27 RX ORDER — PREDNISONE 10 MG/1
TABLET ORAL
Qty: 21 EACH | Refills: 0 | Status: SHIPPED | OUTPATIENT
Start: 2023-11-27

## 2023-11-27 RX ORDER — AMOXICILLIN AND CLAVULANATE POTASSIUM 875; 125 MG/1; MG/1
1 TABLET, FILM COATED ORAL 2 TIMES DAILY
Qty: 14 TABLET | Refills: 0 | Status: SHIPPED | OUTPATIENT
Start: 2023-11-27

## 2023-11-27 RX ORDER — CLOTRIMAZOLE AND BETAMETHASONE DIPROPIONATE 10; .64 MG/G; MG/G
1 CREAM TOPICAL 2 TIMES DAILY
Qty: 45 G | Refills: 2 | Status: SHIPPED | OUTPATIENT
Start: 2023-11-27

## 2023-11-27 NOTE — PROGRESS NOTES
"Subjective   Clifford Higgins Jr. is a 33 y.o. male.     History of Present Illness   Pt presents with CC of cough X 2 months. Started with head cold. Also having some sinus congestion. Pressure X 2 days along with sinus headache. No wheezing or SOB (but did have some of this one first started).  No fever. Not been taking anything over the counter for this   Two children have had similar symptoms.   No changes around the house.     L upper arm rash that has recurred around winter months for the past 3 years  Red and itchy and sometimes gets blisters  Only area affected   Scrapped and applied alcohol recently which he notes did help clear it up some     L jaw, lump that moves around for over a year  Does not hurt   No infection that he is aware of    has not seemed to change in size   No pain with opening mouth or chewing.   UTD on dental cleanings. No issues       The following portions of the patient's history were reviewed and updated as appropriate: allergies, current medications, past family history, past medical history, past social history, past surgical history, and problem list.    Review of Systems  As noted per HPI     Objective   Blood pressure 114/68, pulse 72, temperature 97.1 °F (36.2 °C), resp. rate 18, height 188 cm (74\"), weight 94.6 kg (208 lb 9.6 oz), SpO2 98%.   Physical Exam  Vitals and nursing note reviewed.   Constitutional:       Appearance: Normal appearance.   HENT:      Head: Normocephalic.        Right Ear: Tympanic membrane, ear canal and external ear normal.      Left Ear: Tympanic membrane, ear canal and external ear normal.      Nose: Congestion present.      Mouth/Throat:      Pharynx: No oropharyngeal exudate or posterior oropharyngeal erythema.   Eyes:      Conjunctiva/sclera: Conjunctivae normal.   Cardiovascular:      Rate and Rhythm: Normal rate and regular rhythm.   Pulmonary:      Effort: Pulmonary effort is normal.      Breath sounds: Normal breath sounds.   Skin:     " General: Skin is warm and dry.      Comments: Patch of dry, erythematous skin on L upper arm    Neurological:      Mental Status: He is alert and oriented to person, place, and time.   Psychiatric:         Mood and Affect: Mood normal.         Behavior: Behavior normal.         Assessment & Plan   Diagnoses and all orders for this visit:    1. Rash and nonspecific skin eruption (Primary)  -     clotrimazole-betamethasone (Lotrisone) 1-0.05 % cream; Apply 1 application  topically to the appropriate area as directed 2 (Two) Times a Day.  Dispense: 45 g; Refill: 2    2. Acute maxillary sinusitis, recurrence not specified  -     amoxicillin-clavulanate (AUGMENTIN) 875-125 MG per tablet; Take 1 tablet by mouth 2 (Two) Times a Day.  Dispense: 14 tablet; Refill: 0  -     predniSONE (DELTASONE) 10 MG (21) dose pack; Use as directed on package  Dispense: 21 each; Refill: 0    3. Persistent cough for 3 weeks or longer  -     benzonatate (Tessalon Perles) 100 MG capsule; Take 1 capsule by mouth 3 (Three) Times a Day As Needed for Cough.  Dispense: 30 capsule; Refill: 0    4. Cyst, jaw  -     Ambulatory Referral to Dermatology    Suspect cyst along jaw. Referral to dermatology placed   Treat sinuses and persistent cough as noted above.   Lotrisone for rash, suspect eczema due to presentation during colder months

## 2024-07-18 ENCOUNTER — OFFICE VISIT (OUTPATIENT)
Dept: FAMILY MEDICINE CLINIC | Facility: CLINIC | Age: 34
End: 2024-07-18
Payer: COMMERCIAL

## 2024-07-18 VITALS
WEIGHT: 211 LBS | HEART RATE: 76 BPM | SYSTOLIC BLOOD PRESSURE: 124 MMHG | BODY MASS INDEX: 27.08 KG/M2 | OXYGEN SATURATION: 96 % | HEIGHT: 74 IN | DIASTOLIC BLOOD PRESSURE: 70 MMHG | TEMPERATURE: 97.8 F

## 2024-07-18 DIAGNOSIS — R53.83 FATIGUE, UNSPECIFIED TYPE: ICD-10-CM

## 2024-07-18 DIAGNOSIS — Z00.00 ENCOUNTER FOR WELL ADULT EXAM WITHOUT ABNORMAL FINDINGS: Primary | ICD-10-CM

## 2024-07-18 DIAGNOSIS — E55.9 VITAMIN D DEFICIENCY: ICD-10-CM

## 2024-07-18 DIAGNOSIS — Z13.6 ENCOUNTER FOR LIPID SCREENING FOR CARDIOVASCULAR DISEASE: ICD-10-CM

## 2024-07-18 DIAGNOSIS — Z13.1 SCREENING FOR DIABETES MELLITUS: ICD-10-CM

## 2024-07-18 DIAGNOSIS — Z13.220 ENCOUNTER FOR LIPID SCREENING FOR CARDIOVASCULAR DISEASE: ICD-10-CM

## 2024-07-18 DIAGNOSIS — Z11.59 NEED FOR HEPATITIS C SCREENING TEST: ICD-10-CM

## 2024-07-18 DIAGNOSIS — Z13.29 SCREENING FOR THYROID DISORDER: ICD-10-CM

## 2024-07-18 PROCEDURE — 99395 PREV VISIT EST AGE 18-39: CPT | Performed by: PHYSICIAN ASSISTANT

## 2024-07-18 NOTE — PROGRESS NOTES
"Chief Complaint   Patient presents with    Annual Exam     No concerns.        Subjective   The patient is a 34 y.o. male who presents for annual exam       Nutrition:  some fast food intake. Overall well balanced.    Exercise:  M-F 1.5 gym. Weights and resistance. Lots of walking with work   Sleep:  6 hours     UTD on dental and eye exam      Past Medical History,Medications, Allergies reviewed.         HPI  Pt would like fasting labs  No acute concerns         Objective     /70   Pulse 76   Temp 97.8 °F (36.6 °C)   Ht 188 cm (74\")   Wt 95.7 kg (211 lb)   SpO2 96%   BMI 27.09 kg/m²     Physical Exam  Vitals and nursing note reviewed.   Constitutional:       Appearance: Normal appearance. He is well-developed.   HENT:      Head: Normocephalic and atraumatic.      Right Ear: Tympanic membrane, ear canal and external ear normal.      Left Ear: Tympanic membrane, ear canal and external ear normal.      Nose: Nose normal.      Mouth/Throat:      Pharynx: No oropharyngeal exudate.   Eyes:      Conjunctiva/sclera: Conjunctivae normal.   Neck:      Thyroid: No thyromegaly.      Trachea: No tracheal deviation.   Cardiovascular:      Rate and Rhythm: Normal rate and regular rhythm.      Heart sounds: Normal heart sounds.   Pulmonary:      Effort: Pulmonary effort is normal. No respiratory distress.      Breath sounds: Normal breath sounds. No wheezing or rales.   Chest:      Chest wall: No tenderness.   Abdominal:      General: Bowel sounds are normal. There is no distension.      Palpations: Abdomen is soft. There is no mass.      Tenderness: There is no abdominal tenderness. There is no guarding or rebound.      Hernia: No hernia is present.   Musculoskeletal:      Cervical back: Normal range of motion and neck supple.   Lymphadenopathy:      Cervical: No cervical adenopathy.   Skin:     General: Skin is warm and dry.   Neurological:      Mental Status: He is alert and oriented to person, place, and time. "   Psychiatric:         Mood and Affect: Mood normal.         Behavior: Behavior normal.         Thought Content: Thought content normal.         Judgment: Judgment normal.         Assessment & Plan     1. Encounter for well adult exam without abnormal findings  - CBC w AUTO Differential  - Comprehensive metabolic panel  - Lipid Panel  - TSH  - T4, free  - Vitamin D 25 hydroxy  - Iron Profile  - Vitamin B12  - Folate  - Hemoglobin A1c  - Hepatitis C antibody  - Testosterone  - T3, free  - Thyroid Antibodies    2. Encounter for lipid screening for cardiovascular disease  - Lipid Panel    3. Vitamin D deficiency  - Vitamin D 25 hydroxy    4. Screening for thyroid disorder  - TSH  - T4, free    5. Need for hepatitis C screening test    - Hepatitis C antibody    6. Screening for diabetes mellitus  - Hemoglobin A1c    7. Fatigue, unspecified type  - CBC w AUTO Differential  - Comprehensive metabolic panel  - TSH  - T4, free  - Iron Profile  - Vitamin B12  - Folate  - Testosterone  - T3, free  - Thyroid Antibodies      Screening labs as outlined in plan     Counseling was given to patient for the following topics: instructions for management, risk factor reductions, patient and family education, and impressions . Total time of the encounter was 15 minutes and 7.5 minutes was spent counseling.    RAFAEL Lombardi

## 2024-07-19 ENCOUNTER — LAB (OUTPATIENT)
Dept: FAMILY MEDICINE CLINIC | Facility: CLINIC | Age: 34
End: 2024-07-19
Payer: COMMERCIAL

## 2024-07-22 DIAGNOSIS — E78.00 HYPERCHOLESTEREMIA: Primary | ICD-10-CM

## 2024-07-22 LAB
25(OH)D3+25(OH)D2 SERPL-MCNC: 49.9 NG/ML (ref 30–100)
ALBUMIN SERPL-MCNC: 4.8 G/DL (ref 4.1–5.1)
ALP SERPL-CCNC: 61 IU/L (ref 44–121)
ALT SERPL-CCNC: 23 IU/L (ref 0–44)
AST SERPL-CCNC: 18 IU/L (ref 0–40)
BASOPHILS # BLD AUTO: 0 X10E3/UL (ref 0–0.2)
BASOPHILS NFR BLD AUTO: 1 %
BILIRUB SERPL-MCNC: 0.7 MG/DL (ref 0–1.2)
BUN SERPL-MCNC: 12 MG/DL (ref 6–20)
BUN/CREAT SERPL: 11 (ref 9–20)
CALCIUM SERPL-MCNC: 9.7 MG/DL (ref 8.7–10.2)
CHLORIDE SERPL-SCNC: 102 MMOL/L (ref 96–106)
CHOLEST SERPL-MCNC: 206 MG/DL (ref 100–199)
CO2 SERPL-SCNC: 23 MMOL/L (ref 20–29)
CREAT SERPL-MCNC: 1.13 MG/DL (ref 0.76–1.27)
EGFRCR SERPLBLD CKD-EPI 2021: 88 ML/MIN/1.73
EOSINOPHIL # BLD AUTO: 0 X10E3/UL (ref 0–0.4)
EOSINOPHIL NFR BLD AUTO: 1 %
ERYTHROCYTE [DISTWIDTH] IN BLOOD BY AUTOMATED COUNT: 12.3 % (ref 11.6–15.4)
FOLATE SERPL-MCNC: 17.6 NG/ML
GLOBULIN SER CALC-MCNC: 2.3 G/DL (ref 1.5–4.5)
GLUCOSE SERPL-MCNC: 85 MG/DL (ref 70–99)
HBA1C MFR BLD: 5.6 % (ref 4.8–5.6)
HCT VFR BLD AUTO: 43.9 % (ref 37.5–51)
HCV IGG SERPL QL IA: NON REACTIVE
HDLC SERPL-MCNC: 44 MG/DL
HGB BLD-MCNC: 14.9 G/DL (ref 13–17.7)
IMM GRANULOCYTES # BLD AUTO: 0 X10E3/UL (ref 0–0.1)
IMM GRANULOCYTES NFR BLD AUTO: 0 %
IRON SATN MFR SERPL: 34 % (ref 15–55)
IRON SERPL-MCNC: 106 UG/DL (ref 38–169)
LDLC SERPL CALC-MCNC: 148 MG/DL (ref 0–99)
LYMPHOCYTES # BLD AUTO: 1.5 X10E3/UL (ref 0.7–3.1)
LYMPHOCYTES NFR BLD AUTO: 34 %
MCH RBC QN AUTO: 29.9 PG (ref 26.6–33)
MCHC RBC AUTO-ENTMCNC: 33.9 G/DL (ref 31.5–35.7)
MCV RBC AUTO: 88 FL (ref 79–97)
MONOCYTES # BLD AUTO: 0.4 X10E3/UL (ref 0.1–0.9)
MONOCYTES NFR BLD AUTO: 8 %
NEUTROPHILS # BLD AUTO: 2.5 X10E3/UL (ref 1.4–7)
NEUTROPHILS NFR BLD AUTO: 56 %
PLATELET # BLD AUTO: 161 X10E3/UL (ref 150–450)
POTASSIUM SERPL-SCNC: 4.4 MMOL/L (ref 3.5–5.2)
PROT SERPL-MCNC: 7.1 G/DL (ref 6–8.5)
RBC # BLD AUTO: 4.99 X10E6/UL (ref 4.14–5.8)
SODIUM SERPL-SCNC: 140 MMOL/L (ref 134–144)
T3FREE SERPL-MCNC: 3.4 PG/ML (ref 2–4.4)
T4 FREE SERPL-MCNC: 1.46 NG/DL (ref 0.82–1.77)
TESTOST SERPL-MCNC: 302 NG/DL (ref 264–916)
THYROGLOB AB SERPL-ACNC: <1 IU/ML (ref 0–0.9)
THYROPEROXIDASE AB SERPL-ACNC: <9 IU/ML (ref 0–34)
TIBC SERPL-MCNC: 311 UG/DL (ref 250–450)
TRIGL SERPL-MCNC: 79 MG/DL (ref 0–149)
TSH SERPL DL<=0.005 MIU/L-ACNC: 2.52 UIU/ML (ref 0.45–4.5)
UIBC SERPL-MCNC: 205 UG/DL (ref 111–343)
VIT B12 SERPL-MCNC: 576 PG/ML (ref 232–1245)
VLDLC SERPL CALC-MCNC: 14 MG/DL (ref 5–40)
WBC # BLD AUTO: 4.4 X10E3/UL (ref 3.4–10.8)

## 2024-07-24 ENCOUNTER — PATIENT MESSAGE (OUTPATIENT)
Dept: FAMILY MEDICINE CLINIC | Facility: CLINIC | Age: 34
End: 2024-07-24
Payer: COMMERCIAL

## 2024-07-25 NOTE — TELEPHONE ENCOUNTER
From: Clifford Higgins Jr.  To: Kamari Weber  Sent: 7/24/2024 10:35 PM EDT  Subject: Test results    I got Omege 3 and Red Yeast vitamins that were recommended for high cholesterol. I was wanting to know, with high cholesterol and plaque buildup in bloodstream, is that something that can dissipate over time with the right diet and vitamins? Also I saw that my testosterone levels were in green but very low end and saw that could be linked with high cholesterol levels. Is there something I can take to increase my testosterone?

## 2025-01-03 ENCOUNTER — TELEPHONE (OUTPATIENT)
Dept: FAMILY MEDICINE CLINIC | Facility: CLINIC | Age: 35
End: 2025-01-03
Payer: COMMERCIAL

## 2025-01-03 NOTE — TELEPHONE ENCOUNTER
Caller: Clifford Higgins Jr.    Relationship: Self    Best call back number: [phone]     What orders are you requesting (i.e. lab or imaging): BLOOD WORK     Additional notes: PATIENT STATED THAT HE WAS SUPPOSED TO COME IN FOR FOLLOW UP BLOOD WORK IN NOVEMBER  AND HE DID NOT.   PLEASE PUT IN ORDERS AND CALL PATIENT TO ADVISE WHEN HE CAN COME IN

## 2025-01-09 ENCOUNTER — LAB (OUTPATIENT)
Dept: FAMILY MEDICINE CLINIC | Facility: CLINIC | Age: 35
End: 2025-01-09
Payer: COMMERCIAL

## 2025-01-13 DIAGNOSIS — E78.00 HYPERCHOLESTEROLEMIA: Primary | ICD-10-CM

## 2025-01-13 RX ORDER — ROSUVASTATIN CALCIUM 10 MG/1
10 TABLET, COATED ORAL DAILY
Qty: 90 TABLET | Refills: 1 | Status: SHIPPED | OUTPATIENT
Start: 2025-01-13